# Patient Record
Sex: FEMALE | Race: BLACK OR AFRICAN AMERICAN | Employment: FULL TIME | URBAN - METROPOLITAN AREA
[De-identification: names, ages, dates, MRNs, and addresses within clinical notes are randomized per-mention and may not be internally consistent; named-entity substitution may affect disease eponyms.]

---
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
## 2020-01-01 NOTE — ED PROVIDER NOTES
ED Attending Attestation Note     Date of evaluation: 12/31/2019    This patient was seen by the advance practice provider. I have seen and examined the patient, agree with the workup, evaluation, management and diagnosis. The care plan has been discussed. I have reviewed the ECG and concur with the DEVIN's interpretation. My assessment reveals a well-appearing female no acute distress presents with flulike symptoms with cough congestion and subjective fevers. On my evaluation she has mild cobblestoning in the posterior oropharynx but appears well-hydrated. She has clear breath sounds with active coughing mildly decreased in the right lung base. Otherwise soft and benign abdominal examination for intervention of the neck without nuchal rigidity.      Jennie Cantor MD  12/31/19 2001

## 2020-01-01 NOTE — ED PROVIDER NOTES
[facility]          PHYSICIAN ASSISTANT NOTE       Date of evaluation: 12/31/2019    Chief Complaint     Cough      History of Present Illness     Suad Williamson is a 23 y.o. female with a past medical history as noted below who presents to the Emergency Department with a complaint of \"flu-like symptoms. \"  Patient reports over the past 3 days she has been experiencing cough, congestion, rhinorrhea, myalgias, subjective fevers and generalized malaise. Reports that she lives in an apartment with multiple family members, all of which have had similar symptoms recently. She reports that some of them \"may have tested positive\" for the flu, though she is not completely sure. She does not believe that any of them, other than her nephew is currently taking Tamiflu. She reports having some nausea with occasional emesis as well. Her cough is mildly productive of clear to whitish sputum. She reports a tickling sensation in the back of her throat that makes her feel like she needs to clear her throat regularly and often elicits cough. No rash. No chest pain or dyspnea. The patient carries a history of asthma, but reports that she has not had an asthma exacerbation in some time. Recently, she has not used her rescue inhaler in many months. She is also concerned that she may be pregnant. Review of Systems     Review of Systems   Constitutional: Positive for activity change, appetite change, chills, fatigue and fever. Negative for diaphoresis and unexpected weight change. HENT: Positive for postnasal drip, rhinorrhea, sinus pressure, sneezing and sore throat. Negative for congestion, drooling and mouth sores. Eyes: Negative for pain, redness and itching. Respiratory: Positive for cough. Negative for chest tightness, shortness of breath and wheezing. Cardiovascular: Negative for chest pain, palpitations and leg swelling.    Gastrointestinal: Positive for nausea Musculoskeletal: Normal range of motion. General: No tenderness. Skin:     General: Skin is warm and dry. Coloration: Skin is not pale. Findings: No rash. Neurological:      Mental Status: She is alert and oriented to person, place, and time. Psychiatric:         Behavior: Behavior normal.         Diagnostic Results     RADIOLOGY:  XR CHEST STANDARD (2 VW)   Final Result      No acute cardiopulmonary findings. LABS:   Results for orders placed or performed during the hospital encounter of 12/31/19   Rapid Flu Swab   Result Value Ref Range    Rapid Influenza A Ag Negative Negative    Rapid Influenza B Ag Negative Negative   Pregnancy, Urine   Result Value Ref Range    HCG(Urine) Pregnancy Test Negative Detects HCG level >20 MIU/mL   Basic Metabolic Panel w/ Reflex to MG   Result Value Ref Range    Sodium 138 136 - 145 mmol/L    Potassium reflex Magnesium 3.8 3.5 - 5.1 mmol/L    Chloride 103 99 - 110 mmol/L    CO2 19 (L) 21 - 32 mmol/L    Anion Gap 16 3 - 16    Glucose 99 70 - 99 mg/dL    BUN 9 7 - 20 mg/dL    CREATININE 0.7 0.6 - 1.1 mg/dL    GFR Non-African American >60 >60    GFR African American >60 >60    Calcium 9.1 8.3 - 10.6 mg/dL       ED BEDSIDE ULTRASOUND:  N/A    RECENT VITALS:  BP: (!) 126/115, Temp: 99.7 °F (37.6 °C), Heart Rate: 97, Resp: 20, SpO2: 93 %     Procedures     N/A    ED Course     Nursing Notes, Past Medical Hx,Past Surgical Hx, Social Hx, Allergies, and Family Hx were reviewed.     The patient was given the following medications:  Orders Placed This Encounter   Medications    ondansetron (ZOFRAN-ODT) disintegrating tablet 4 mg    ibuprofen (ADVIL;MOTRIN) tablet 400 mg    acetaminophen (TYLENOL) tablet 650 mg    guaiFENesin-dextromethorphan (ROBITUSSIN DM) 100-10 MG/5ML syrup 5 mL       CONSULTS:  None    MEDICAL DECISION MAKING / ASSESSMENT / Regi Lab is admitted to the Emergency Department for evaluation of her chief complaint as described in the history of present illness. Complete history and physical was performed by me and my attending. Nursing notes, past medical history, surgical history, family history and social history were reviewed and addressed in the HPI. Kwan Sood is a 23 y.o. female who presents to the emergency department with a complaint of flu-like symptoms. Patient has had symptoms for the past 3 to 4 days. She reports several family members having similar illness symptoms, believes a number of them have been positive for the flu. On physical examination, she is well-appearing, demonstrates some mild pharyngeal erythema with cobblestoning, has clear breath sounds and is hemodynamically stable and within normal limits. Rapid flu swab is negative. Chest x-ray is unremarkable. BMP is also unremarkable. Her presenting symptoms and associated physical exam findings are most consistent with a non-complicated upper respiratory tract infection for which my clinical index of suspicion for a life-threatening serious bacterial illness is very low. There are no clinical manifestations of impending respiratory distress or severe sepsis. She is therefore expected to do well with conservative management, hydration and traditional supportive care. Patient demonstrated improvement with Zofran, Advil and Tylenol in the emergency department. I strongly advised her to push oral hydration at home. Discussed her contagious nature and stay away from others with significant illness at this time. Follow-up via primary care I discussed this plan at length the patient who verbalizes understanding and is in agreement. The patient is currently stable and will be discharged home for continued self-care. Please see patient's AVS for additional discharge instructions. The patient was seen and evaluated by myself and the attending physician, Barron Vinson MD who agrees with my assessment, treatment and plan.         Clinical Impression     1.  Upper respiratory tract infection, unspecified type        Disposition     PATIENT REFERRED TO:  Shira Zuniga MD  [address] [facility] [address]  [phone]    Schedule an appointment as soon as possible for a visit in 3 days  If symptoms are not improving on the prescribed regimen      DISCHARGE MEDICATIONS:  Discharge Medication List as of 12/31/2019  9:34 PM          DISPOSITION Decision To Discharge 12/31/2019 09:32:32 PM       [address] [place] [address]  12/31/19 1777

## 2020-01-26 ENCOUNTER — HOSPITAL ENCOUNTER (EMERGENCY)
Age: 20
Discharge: HOME | End: 2020-01-26
Payer: MEDICAID

## 2020-01-26 VITALS
HEIGHT: 65 IN | DIASTOLIC BLOOD PRESSURE: 68 MMHG | WEIGHT: 196 LBS | SYSTOLIC BLOOD PRESSURE: 125 MMHG | HEART RATE: 67 BPM | OXYGEN SATURATION: 100 % | BODY MASS INDEX: 32.65 KG/M2 | TEMPERATURE: 98.3 F | RESPIRATION RATE: 16 BRPM

## 2020-01-26 LAB
ANION GAP SERPL CALCULATED.3IONS-SCNC: 14 MMOL/L (ref 3–16)
BACTERIA WET PREP: NORMAL
BASOPHILS ABSOLUTE: 0 K/UL (ref 0–0.2)
BASOPHILS RELATIVE PERCENT: 0.3 %
BILIRUBIN URINE: NEGATIVE
BLOOD, URINE: ABNORMAL
BUN BLDV-MCNC: 12 MG/DL (ref 7–20)
CALCIUM SERPL-MCNC: 9.3 MG/DL (ref 8.3–10.6)
CHLORIDE BLD-SCNC: 103 MMOL/L (ref 99–110)
CLARITY: CLEAR
CLUE CELLS: NORMAL
CO2: 20 MMOL/L (ref 21–32)
COLOR: YELLOW
CREAT SERPL-MCNC: 0.6 MG/DL (ref 0.6–1.1)
EOSINOPHILS ABSOLUTE: 0.3 K/UL (ref 0–0.6)
EOSINOPHILS RELATIVE PERCENT: 4.4 %
EPITHELIAL CELLS WET PREP: NORMAL
EPITHELIAL CELLS, UA: 0 /HPF (ref 0–5)
GFR AFRICAN AMERICAN: >60
GFR NON-AFRICAN AMERICAN: >60
GLUCOSE BLD-MCNC: 79 MG/DL (ref 70–99)
GLUCOSE URINE: NEGATIVE MG/DL
HCG QUALITATIVE: NEGATIVE
HCT VFR BLD CALC: 34.9 % (ref 36–48)
HEMOGLOBIN: 11 G/DL (ref 12–16)
HYALINE CASTS: 0 /LPF (ref 0–8)
KETONES, URINE: NEGATIVE MG/DL
LEUKOCYTE ESTERASE, URINE: NEGATIVE
LYMPHOCYTES ABSOLUTE: 1.5 K/UL (ref 1–5.1)
LYMPHOCYTES RELATIVE PERCENT: 20.4 %
MCH RBC QN AUTO: 23.1 PG (ref 26–34)
MCHC RBC AUTO-ENTMCNC: 31.6 G/DL (ref 31–36)
MCV RBC AUTO: 73.1 FL (ref 80–100)
MICROSCOPIC EXAMINATION: YES
MONOCYTES ABSOLUTE: 0.8 K/UL (ref 0–1.3)
MONOCYTES RELATIVE PERCENT: 10.2 %
NEUTROPHILS ABSOLUTE: 4.8 K/UL (ref 1.7–7.7)
NEUTROPHILS RELATIVE PERCENT: 64.7 %
NITRITE, URINE: NEGATIVE
PDW BLD-RTO: 17.4 % (ref 12.4–15.4)
PH UA: 6.5 (ref 5–8)
PLATELET # BLD: 248 K/UL (ref 135–450)
PMV BLD AUTO: 9.1 FL (ref 5–10.5)
POTASSIUM SERPL-SCNC: 3.7 MMOL/L (ref 3.5–5.1)
PROTEIN UA: NEGATIVE MG/DL
RBC # BLD: 4.78 M/UL (ref 4–5.2)
RBC UA: 98 /HPF (ref 0–4)
RBC WET PREP: NORMAL
SODIUM BLD-SCNC: 137 MMOL/L (ref 136–145)
SOURCE WET PREP: NORMAL
SPECIFIC GRAVITY UA: 1.02 (ref 1–1.03)
TRICHOMONAS PREP: NORMAL
URINE REFLEX TO CULTURE: ABNORMAL
URINE TYPE: ABNORMAL
UROBILINOGEN, URINE: 1 E.U./DL
WBC # BLD: 7.5 K/UL (ref 4–11)
WBC UA: 0 /HPF (ref 0–5)
WBC WET PREP: NORMAL
YEAST WET PREP: NORMAL

## 2020-01-26 PROCEDURE — 85025 COMPLETE CBC W/AUTO DIFF WBC: CPT

## 2020-01-26 PROCEDURE — 84703 CHORIONIC GONADOTROPIN ASSAY: CPT

## 2020-01-26 PROCEDURE — 87591 N.GONORRHOEAE DNA AMP PROB: CPT

## 2020-01-26 PROCEDURE — 87491 CHLMYD TRACH DNA AMP PROBE: CPT

## 2020-01-26 PROCEDURE — 80048 BASIC METABOLIC PNL TOTAL CA: CPT

## 2020-01-26 PROCEDURE — 87210 SMEAR WET MOUNT SALINE/INK: CPT

## 2020-01-26 PROCEDURE — 81001 URINALYSIS AUTO W/SCOPE: CPT

## 2020-01-26 PROCEDURE — 99284 EMERGENCY DEPT VISIT MOD MDM: CPT

## 2020-01-26 ASSESSMENT — PAIN DESCRIPTION - PROGRESSION: CLINICAL_PROGRESSION: GRADUALLY WORSENING

## 2020-01-26 ASSESSMENT — PAIN - FUNCTIONAL ASSESSMENT: PAIN_FUNCTIONAL_ASSESSMENT: PREVENTS OR INTERFERES SOME ACTIVE ACTIVITIES AND ADLS

## 2020-01-26 ASSESSMENT — ENCOUNTER SYMPTOMS
BACK PAIN: 0
NAUSEA: 0
VOMITING: 0
SHORTNESS OF BREATH: 0
COLOR CHANGE: 0

## 2020-01-26 ASSESSMENT — PAIN DESCRIPTION - DESCRIPTORS: DESCRIPTORS: CRAMPING

## 2020-01-26 ASSESSMENT — PAIN DESCRIPTION - PAIN TYPE: TYPE: ACUTE PAIN

## 2020-01-26 ASSESSMENT — PAIN SCALES - GENERAL: PAINLEVEL_OUTOF10: 6

## 2020-01-26 ASSESSMENT — PAIN DESCRIPTION - FREQUENCY: FREQUENCY: INTERMITTENT

## 2020-01-26 ASSESSMENT — PAIN DESCRIPTION - LOCATION: LOCATION: ABDOMEN

## 2020-01-27 LAB
C TRACH DNA GENITAL QL NAA+PROBE: NEGATIVE
N. GONORRHOEAE DNA: NEGATIVE

## 2020-01-27 NOTE — ED PROVIDER NOTES
[address] [facility]      Pt Name: Sumi Vides  MRN: [id number]  [place] 2000  Date of evaluation: 2020  Provider: Kern Galeazzi, PA    This patient was not seen and evaluated by the attending physician No att. providers found. CHIEF COMPLAINT       Chief Complaint   Patient presents with    Vaginal Bleeding     patient states heavy vaginal bleeding x 2 days. also requests a preg test and for someone to check her csection scar. CRITICAL CARE TIME   I performed a total Critical Care time of  15 minutes, excluding separately reportable procedures. There was a high probability of clinically significant/life threatening deterioration in the patient's condition which required my urgent intervention. Not limited to multiple reexaminations, discussions with attending physician and consultants. HISTORY OF PRESENT ILLNESS  (Location/Symptom, Timing/Onset, Context/Setting, Quality, Duration, Modifying Factors, Severity.)   Sumi Vides is a 23 y.o. female who presents to the emergency department via EMS. She tells me that she is homeless and lost custody of her  child. She states that the person she is living with currently is also struggling so she is hungry and requesting something to eat. She complains of vaginal bleeding that she initially described as heavy and then states she has had to change her pad twice today. She had a   at [facility]. She has had bleeding since then and then started again 2 days ago. No large clots. She is also requesting a pregnancy test.  No fevers or vomiting. Past medical history of asthma and PTSD. Nursing Notes were reviewed and I agree. REVIEW OF SYSTEMS    (2-9 systems for level 4, 10 or more for level 5)     Review of Systems   Constitutional: Negative for fever. Respiratory: Negative for shortness of breath.     Gastrointestinal: Negative for nausea and (1.651 m) 196 lb (88.9 kg)     Physical Exam  Vitals signs and nursing note reviewed. Constitutional:       Appearance: Normal appearance. HENT:      Head: Normocephalic. Neck:      Musculoskeletal: Normal range of motion. Cardiovascular:      Rate and Rhythm: Normal rate. Genitourinary:     Comments: External genital genitalia reveals no swelling, erythema, or external lesions. No rash or ulcer noted. Speculum was inserted without difficulty. Scant blood in the vaginal vault. Bimanual exam reveals no cervical motion tenderness, no uterine tenderness, and no adnexal tenderness. No masses appreciated. Performed with chaperone. Pt advised this is not full STD screening or PAP smear. Pt advised to follow up with GYN for further testing, pap smear and follow up care. Pt advised to follow up in 2 days for pelvic culture results. Do not have sex until you know your culture results are negative and you have further advised. Neurological:      Mental Status: She is alert.          DIAGNOSTIC RESULTS     NONE    LABS:  Labs Reviewed   CBC WITH AUTO DIFFERENTIAL - Abnormal; Notable for the following components:       Result Value    Hemoglobin 11.0 (*)     Hematocrit 34.9 (*)     MCV 73.1 (*)     MCH 23.1 (*)     RDW 17.4 (*)     All other components within normal limits    Narrative:     Performed at:  [address] [address]   Phone [phone]   BASIC METABOLIC PANEL - Abnormal; Notable for the following components:    CO2 20 (*)     All other components within normal limits    Narrative:     Performed at:  [address] [address]   Phone [phone]   URINE RT REFLEX TO CULTURE - Abnormal; Notable for the following components:    Blood, Urine LARGE (*)     All other components within normal limits    Narrative:     Performed at:  [facility]  [address] [place], [place] [address]   Phone [phone]   MICROSCOPIC URINALYSIS - Abnormal; Notable for the following components:    RBC, UA 98 (*)     All other components within normal limits    Narrative:     Performed at:  [facility]  [address] [place], [place] [address]   Phone [phone]   WET PREP, GENITAL    Narrative:     Performed at:  [facility]  [address] [facility] [address]   Phone [phone]   C. TRACHOMATIS N.GONORRHOEAE DNA   HCG, SERUM, QUALITATIVE    Narrative:     Performed at:  [facility]  [address] [facility] [address]   Phone [phone]       All other labs were within normal range or not returned as of this dictation. EMERGENCY DEPARTMENT COURSE and DIFFERENTIAL DIAGNOSIS/MDM:   Vitals:    Vitals:    20 1921   BP: 125/68   Pulse: 67   Resp: 16   Temp: 98.3 °F (36.8 °C)   TempSrc: Oral   SpO2: 100%   Weight: 196 lb (88.9 kg)   Height: 5' 5\" (1.651 m)     I discussed with María Bolaños and/or family the exam results, diagnosis, care, prognosis, reasons to return and the importance of follow up. Patient and/or family is in full agreement with plan and all questions have been answered. Specific discharge instructions explained, including reasons to return to the emergency department. María Bolaños is well appearing, non-toxic, and afebrile at the time of discharge. Patient has been in both [place] and [facility] since her  on . Her belly is soft and nontender she has very scant blood in the vaginal vault a negative pregnancy test.  Stable mild anemia. Advised her to take supplements with iron and follow-up with gynecology. She did request STD testing. She is eating a sandwich my serial exam continues to have a benign abdomen. Return for new, worsening or other concerns.     I estimate there is LOW risk for ACUTE APPENDICITIS, BOWEL OBSTRUCTION, CHOLECYSTITIS, DIVERTICULITIS, INCARCERATED HERNIA, PANCREATITIS, PELVIC INFLAMMATORY DISEASE, OVARIAN TORSION, PERFORATED BOWEL,  BOWEL ISCHEMIA, CARDIAC ISCHEMIA, ECTOPIC PREGNANCY, or TUBO-OVARIAN ABSCESS, thus I consider the discharge disposition reasonable. Also, there is no evidence or peritonitis, sepsis, or toxicity. CONSULTS:  None    PROCEDURES:  None    FINAL IMPRESSION      1. Vaginal bleeding    2.  Negative pregnancy test          DISPOSITION/PLAN   DISPOSITION Decision To Discharge 01/26/2020 08:38:28 PM      PATIENT REFERRED TO:  Diann Neves, [address] [place], [place] [id number] [phone]    Call in 1 day  For follow up with OBGYN      DISCHARGE MEDICATIONS:  New Prescriptions    No medications on file       (Please note that portions of this note were completed with a voice recognition program.  Efforts were made to edit the dictations but occasionally words are mis-transcribed.)    Kern Galeazzi, [address], [address]  01/26/20 0612

## 2020-01-27 NOTE — ED NOTES
Discharge and education instructions reviewed. Patient instructed to follow-up as noted - return to emergency department if symptoms worsen. Patient verbalized understanding. Patient denied questions at this time. No acute distress noted. Discharged per provider with discharge instructions. Pt given lyft per charge RN. Pt given lyft information.       Kandis Frederick RN  01/26/20 6621

## 2020-02-13 ENCOUNTER — HOSPITAL ENCOUNTER (EMERGENCY)
Age: 20
Discharge: HOME | End: 2020-02-13
Payer: MEDICAID

## 2020-02-13 VITALS
BODY MASS INDEX: 31.63 KG/M2 | SYSTOLIC BLOOD PRESSURE: 102 MMHG | HEART RATE: 66 BPM | HEIGHT: 65 IN | WEIGHT: 189.82 LBS | OXYGEN SATURATION: 100 % | DIASTOLIC BLOOD PRESSURE: 80 MMHG | RESPIRATION RATE: 18 BRPM | TEMPERATURE: 98.1 F

## 2020-02-13 LAB
BACTERIA WET PREP: NORMAL
BACTERIA: ABNORMAL /HPF
BILIRUBIN URINE: NEGATIVE
BLOOD, URINE: NEGATIVE
CLARITY: CLEAR
CLUE CELLS: NORMAL
COLOR: YELLOW
EPITHELIAL CELLS WET PREP: NORMAL
EPITHELIAL CELLS, UA: ABNORMAL /HPF (ref 0–5)
GLUCOSE URINE: NEGATIVE MG/DL
HCG(URINE) PREGNANCY TEST: NEGATIVE
KETONES, URINE: ABNORMAL MG/DL
LEUKOCYTE ESTERASE, URINE: NEGATIVE
MICROSCOPIC EXAMINATION: YES
MUCUS: ABNORMAL /LPF
NITRITE, URINE: NEGATIVE
PH UA: 6 (ref 5–8)
PROTEIN UA: ABNORMAL MG/DL
RBC UA: ABNORMAL /HPF (ref 0–4)
RBC WET PREP: NORMAL
SOURCE WET PREP: NORMAL
SPECIFIC GRAVITY UA: >=1.03 (ref 1–1.03)
TRICHOMONAS PREP: NORMAL
URINE REFLEX TO CULTURE: ABNORMAL
URINE TYPE: ABNORMAL
UROBILINOGEN, URINE: 0.2 E.U./DL
WBC UA: ABNORMAL /HPF (ref 0–5)
WBC WET PREP: NORMAL
YEAST WET PREP: NORMAL

## 2020-02-13 PROCEDURE — 87491 CHLMYD TRACH DNA AMP PROBE: CPT

## 2020-02-13 PROCEDURE — 99281 EMR DPT VST MAYX REQ PHY/QHP: CPT

## 2020-02-13 PROCEDURE — 81001 URINALYSIS AUTO W/SCOPE: CPT

## 2020-02-13 PROCEDURE — 87591 N.GONORRHOEAE DNA AMP PROB: CPT

## 2020-02-13 PROCEDURE — 87210 SMEAR WET MOUNT SALINE/INK: CPT

## 2020-02-13 PROCEDURE — 84703 CHORIONIC GONADOTROPIN ASSAY: CPT

## 2020-02-13 ASSESSMENT — PAIN DESCRIPTION - LOCATION: LOCATION: ABDOMEN

## 2020-02-13 ASSESSMENT — PAIN SCALES - GENERAL: PAINLEVEL_OUTOF10: 5

## 2020-02-13 ASSESSMENT — PAIN DESCRIPTION - ONSET: ONSET: SUDDEN

## 2020-02-13 ASSESSMENT — ENCOUNTER SYMPTOMS
RESPIRATORY NEGATIVE: 1
GASTROINTESTINAL NEGATIVE: 1

## 2020-02-13 ASSESSMENT — PAIN DESCRIPTION - FREQUENCY: FREQUENCY: CONTINUOUS

## 2020-02-13 ASSESSMENT — PAIN DESCRIPTION - PAIN TYPE: TYPE: ACUTE PAIN

## 2020-02-13 ASSESSMENT — PAIN DESCRIPTION - ORIENTATION: ORIENTATION: LOWER

## 2020-02-14 LAB
C TRACH DNA GENITAL QL NAA+PROBE: NEGATIVE
N. GONORRHOEAE DNA: NEGATIVE

## 2020-02-14 NOTE — ED PROVIDER NOTES
[address] [facility]  eMERGENCY dEPARTMENT eNCOUnter    Pt Name: @@  MRN: [id number]  [birth year]  Date of evaluation: 2020  Provider: MARIA DE JESUS Isaacs CNP     Evaluated by the advanced practice provider    CHIEF COMPLAINT       Chief Complaint   Patient presents with    Vaginal Discharge     States that she has taken multiple pregnancy tests from [facility] and they were positive, states that she just had a  in nov, and is having a brown discharge and pain around her  scar         HISTORY OF PRESENT ILLNESS  (Location/Symptom, Timing/Onset, Context/Setting, Quality, Duration, Modifying Factors, Severity.)   Hunter Campos is a 23 y.o. female who presents to the emergency department requesting a pregnancy test that she is taken multiple pregnancy test from [facility] and they were positive. No abdominal pain. Last menstrual cycle last week and a half of January. Was also seen in the emergency department  and had a vaginal exam done for vaginal discharge. Wet prep was negative and gonorrhea and Chlamydia were negative at that time as well. Full-term delivery  in 2019. Did not have a menstrual cycle and 2020. Had the last menstrual cycle in 2020    Nursing Notes were reviewed and I agree. REVIEW OF SYSTEMS    (2-9 systems for level 4, 10 or more for level 5)     Review of Systems   Constitutional: Negative. HENT: Negative. Respiratory: Negative. Cardiovascular: Negative. Gastrointestinal: Negative. Genitourinary: Positive for vaginal discharge. Negative for difficulty urinating, dysuria, frequency, genital sores and pelvic pain. Brownish intermittently   Musculoskeletal: Negative. Except as noted above the remainder of the review of systems was reviewed and negative.        PAST MEDICAL HISTORY         Diagnosis Date    ADHD     Anxiety     Asthma     Constipation  Depression     PTSD (post-traumatic stress disorder)     Seasonal allergies        SURGICAL HISTORY           Procedure Laterality Date     SECTION  2019       CURRENT MEDICATIONS       Discharge Medication List as of 2020  9:12 PM      CONTINUE these medications which have NOT CHANGED    Details   naproxen (NAPROSYN) 500 MG tablet Take 1 tablet by mouth 2 times daily (with meals), Disp-60 tablet, R-0Print      ziprasidone (GEODON) 80 MG capsule Take 80 mg by mouth 2 times daily (with meals)Historical Med      clomiPRAMINE (ANAFRANIL) 25 MG capsule Take 25 mg by mouth nightlyHistorical Med      lisdexamfetamine (VYVANSE) 50 MG capsule Take 50 mg by mouth every morning . Historical Med      Ferrous Sulfate (IRON) 28 MG TABS Take by mouthHistorical Med      Norgestimate-Eth Estradiol (MONONESSA PO) Take by mouthHistorical Med      albuterol sulfate  (90 Base) MCG/ACT inhaler Inhale 2 puffs into the lungs every 6 hours as needed for WheezingHistorical Med      FLUTICASONE FUROATE NA by Nasal routeHistorical Med             ALLERGIES     Patient has no known allergies. FAMILY HISTORY     History reviewed. No pertinent family history. No family status information on file. SOCIAL HISTORY      reports that she has been smoking cigarettes. She has been smoking about 0.25 packs per day. She has never used smokeless tobacco. She reports current alcohol use. She reports that she does not use drugs. PHYSICAL EXAM    (up to 7 for level 4, 8 or more for level 5)      ED Triage Vitals [20 1937]   BP Temp Temp Source Heart Rate Resp SpO2 Height Weight - Scale   102/80 98.1 °F (36.7 °C) Oral 66 18 100 % 5' 5\" (1.651 m) 189 lb 13.1 oz (86.1 kg)       Physical Exam  Vitals signs and nursing note reviewed. Constitutional:       General: She is awake. She is not in acute distress. Appearance: Normal appearance. She is well-developed. She is not ill-appearing or toxic-appearing.

## 2020-02-14 NOTE — ED NOTES
Discharge instructions reviewed with pt and pt denied having any questions. Discharge paperwork signed and pt discharged.         Juan Pablo Jiang RN  02/13/20 5021

## 2020-03-15 ENCOUNTER — HOSPITAL ENCOUNTER (EMERGENCY)
Age: 20
Discharge: HOME | End: 2020-03-15
Attending: EMERGENCY MEDICINE
Payer: MEDICAID

## 2020-03-15 VITALS
HEIGHT: 65 IN | WEIGHT: 193.56 LBS | OXYGEN SATURATION: 100 % | TEMPERATURE: 97.4 F | DIASTOLIC BLOOD PRESSURE: 87 MMHG | BODY MASS INDEX: 32.25 KG/M2 | HEART RATE: 67 BPM | RESPIRATION RATE: 20 BRPM | SYSTOLIC BLOOD PRESSURE: 127 MMHG

## 2020-03-15 LAB
BILIRUBIN URINE: NEGATIVE
BLOOD, URINE: NEGATIVE
CLARITY: CLEAR
COLOR: YELLOW
GLUCOSE URINE: NEGATIVE MG/DL
HCG(URINE) PREGNANCY TEST: NEGATIVE
KETONES, URINE: ABNORMAL MG/DL
LEUKOCYTE ESTERASE, URINE: NEGATIVE
MICROSCOPIC EXAMINATION: ABNORMAL
NITRITE, URINE: NEGATIVE
PH UA: 6.5 (ref 5–8)
PROTEIN UA: NEGATIVE MG/DL
S PYO AG THROAT QL: NEGATIVE
SPECIFIC GRAVITY UA: >=1.03 (ref 1–1.03)
URINE REFLEX TO CULTURE: ABNORMAL
URINE TYPE: ABNORMAL
UROBILINOGEN, URINE: 1 E.U./DL

## 2020-03-15 PROCEDURE — 99283 EMERGENCY DEPT VISIT LOW MDM: CPT

## 2020-03-15 PROCEDURE — 6370000000 HC RX 637 (ALT 250 FOR IP): Performed by: EMERGENCY MEDICINE

## 2020-03-15 PROCEDURE — 81003 URINALYSIS AUTO W/O SCOPE: CPT

## 2020-03-15 PROCEDURE — 6360000002 HC RX W HCPCS: Performed by: EMERGENCY MEDICINE

## 2020-03-15 PROCEDURE — 84703 CHORIONIC GONADOTROPIN ASSAY: CPT

## 2020-03-15 PROCEDURE — 87880 STREP A ASSAY W/OPTIC: CPT

## 2020-03-15 PROCEDURE — 87081 CULTURE SCREEN ONLY: CPT

## 2020-03-15 RX ORDER — IBUPROFEN 600 MG/1
600 TABLET ORAL ONCE
Status: COMPLETED | OUTPATIENT
Start: 2020-03-15 | End: 2020-03-15

## 2020-03-15 RX ORDER — DEXAMETHASONE 4 MG/1
8 TABLET ORAL ONCE
Status: COMPLETED | OUTPATIENT
Start: 2020-03-15 | End: 2020-03-15

## 2020-03-15 RX ORDER — IBUPROFEN 600 MG/1
600 TABLET ORAL EVERY 6 HOURS PRN
Qty: 40 TABLET | Refills: 0 | Status: SHIPPED | OUTPATIENT
Start: 2020-03-15 | End: 2020-08-20

## 2020-03-15 RX ADMIN — IBUPROFEN 600 MG: 600 TABLET, FILM COATED ORAL at 12:14

## 2020-03-15 RX ADMIN — DEXAMETHASONE 8 MG: 4 TABLET ORAL at 12:14

## 2020-03-15 ASSESSMENT — PAIN DESCRIPTION - LOCATION: LOCATION: THROAT

## 2020-03-15 ASSESSMENT — PAIN SCALES - GENERAL
PAINLEVEL_OUTOF10: 6

## 2020-03-15 ASSESSMENT — PAIN DESCRIPTION - ORIENTATION: ORIENTATION: MID

## 2020-03-15 ASSESSMENT — PAIN DESCRIPTION - DESCRIPTORS: DESCRIPTORS: SORE

## 2020-03-15 ASSESSMENT — PAIN DESCRIPTION - FREQUENCY: FREQUENCY: CONTINUOUS

## 2020-03-15 ASSESSMENT — PAIN DESCRIPTION - PAIN TYPE: TYPE: ACUTE PAIN

## 2020-03-15 NOTE — ED NOTES
Pt given lemon lime soda/lebron crackers/granola bar s/p she was cleared to eat/drink by TRACY Aponte/     Billy Alberts RN  03/15/20 7691

## 2020-03-15 NOTE — ED PROVIDER NOTES
CHIEF COMPLAINT  Pharyngitis      HISTORY OF PRESENT ILLNESS  Keenan Blackburn is a 23 y.o. female who presents to the ED complaining of sore throat that started this morning. Patient states she looked in the mirror and it looks like her uvula might be slightly swollen. States the pain is a sharp/burning pain when she swallows. Denies any difficulty tolerating her secretions. No shortness of breath. Denies any sore throat yesterday. No fevers or chills. No nausea or vomiting. No cough or sputum production. States she does have some nasal congestion has been present for the past 2 days. Also has seasonal allergies. Denies take any medication for sore throat prior to coming to the ED. Smokes approximately a quarter of a pack of cigarettes a day. No other complaints, modifying factors or associated symptoms. Nursing notes reviewed. Past Medical History:   Diagnosis Date    ADHD     Anxiety     Asthma     Constipation     Depression     PTSD (post-traumatic stress disorder)     Seasonal allergies      Past Surgical History:   Procedure Laterality Date     SECTION  2019     No family history on file.   Social History     Socioeconomic History    Marital status: Single     Spouse name: Not on file    Number of children: Not on file    Years of education: Not on file    Highest education level: Not on file   Occupational History    Not on file   Social Needs    Financial resource strain: Not on file    Food insecurity     Worry: Not on file     Inability: Not on file    Transportation needs     Medical: Not on file     Non-medical: Not on file   Tobacco Use    Smoking status: Current Some Day Smoker     Packs/day: 0.25     Types: Cigarettes    Smokeless tobacco: Never Used   Substance and Sexual Activity    Alcohol use: Yes     Comment: Special occassions    Drug use: No    Sexual activity: Not on file   Lifestyle    Physical activity     Days per week: Not on file Minutes per session: Not on file    Stress: Not on file   Relationships    Social connections     Talks on phone: Not on file     Gets together: Not on file     Attends Muslim service: Not on file     Active member of club or organization: Not on file     Attends meetings of clubs or organizations: Not on file     Relationship status: Not on file    Intimate partner violence     Fear of current or ex partner: Not on file     Emotionally abused: Not on file     Physically abused: Not on file     Forced sexual activity: Not on file   Other Topics Concern    Not on file   Social History Narrative    Not on file     No current facility-administered medications for this encounter. Current Outpatient Medications   Medication Sig Dispense Refill    ibuprofen (ADVIL;MOTRIN) 600 MG tablet Take 1 tablet by mouth every 6 hours as needed for Pain 40 tablet 0    Dyclonine-Glycerin (CEPACOL SORE THROAT SPRAY) 0.1-33 % LIQD 2 sprays in the throat every 3 hours as needed for sore throat 118 mL 0    ziprasidone (GEODON) 80 MG capsule Take 80 mg by mouth 2 times daily (with meals)      clomiPRAMINE (ANAFRANIL) 25 MG capsule Take 25 mg by mouth nightly      lisdexamfetamine (VYVANSE) 50 MG capsule Take 50 mg by mouth every morning .       Ferrous Sulfate (IRON) 28 MG TABS Take by mouth      Norgestimate-Eth Estradiol (MONONESSA PO) Take by mouth      albuterol sulfate  (90 Base) MCG/ACT inhaler Inhale 2 puffs into the lungs every 6 hours as needed for Wheezing      FLUTICASONE FUROATE NA by Nasal route       No Known Allergies      REVIEW OF SYSTEMS  10 systems reviewed, pertinent positives per HPI otherwise noted to be negative    PHYSICAL EXAM  /87   Pulse 67   Temp 97.4 °F (36.3 °C) (Oral)   Resp 20   Ht 5' 5\" (1.651 m)   Wt 193 lb 9 oz (87.8 kg)   LMP 02/21/2020 (Approximate)   SpO2 100%   BMI 32.21 kg/m²      CONSTITUTIONAL: AOx4, cooperative with exam, afebrile   HEAD: normocephalic, atraumatic   EYES: PERRL, EOMI, anicteric sclera   ENT: Moist mucous membranes, uvula midline, mild swelling of the uvula, no tonsillar hypertrophy or swelling, no exudate, no stridor   NECK: Supple, symmetric, trachea midline, full range of motion, no meningismus   LUNGS: Bilateral breath sounds, CTAB, no rales/ronchi/wheezes   CARDIOVASCULAR: RRR, normal S1/S2, no m/r/g, 2+ pulses throughout   ABDOMEN: Soft, non-tender, non-distended, +BS   NEUROLOGIC:  MAEx4, GCS 15   MUSCULOSKELETAL: No clubbing, cyanosis or edema   SKIN: No rash, pallor or wounds on exposed surfaces         RADIOLOGY  X-RAYS:  I have reviewed radiologic plain film image(s). ALL OTHER NON-PLAIN FILM IMAGES SUCH AS CT, ULTRASOUND AND MRI HAVE BEEN READ BY THE RADIOLOGIST. No orders to display          EKG INTERPRETATION  None    PROCEDURES    ED COURSE/MDM  Viral pharyngitis, strep pharyngitis, uvulitis, postnasal drip, seasonal allergies    Patient seen and evaluated. History and physical as above. Nontoxic and afebrile. Does have mild swelling of the uvula. No tonsillar hypertrophy or exudates suggest strep pharyngitis although with uvula swelling, will obtain strep swab. No stridor or signs of airway compromise. No drooling. Tolerating her secretions and speaking in full sentences. Will provide a dose of Decadron and ibuprofen. Patient strep swab negative. Patient requesting pregnancy testing as well. Urinalysis and urine pregnancy negative. Patient updated on results. Plan for discharge with ibuprofen and Cepacol with outpatient follow-up. Patient agreeable. Return instructions provided. All questions answered. I estimate there is LOW risk for a ANAPHYLAXIS, DEEP SPACE INFECTION (e.g., PRATIMAS ANGINA OR RETROPHARYNGEAL ABSCESS), EPIGLOTTITIS, MENINGITIS, or AIRWAY COMPROMISE, thus I consider the discharge disposition reasonable. Also, there is no evidence or peritonitis, sepsis, or toxicity.  Poncho Abdi and I have discussed the diagnosis and risks, and we agree with discharging home to follow-up with their primary doctor. We also discussed returning to the Emergency Department immediately if new or worsening symptoms occur. We have discussed the symptoms which are most concerning (e.g., changing or worsening pain, trouble swallowing or breathing, neck stiffness or fever) that necessitate immediate return. Patient was given scripts for the following medications. I counseled patient how to take these medications. New Prescriptions    DYCLONINE-GLYCERIN (CEPACOL SORE THROAT SPRAY) 0.1-33 % LIQD    2 sprays in the throat every 3 hours as needed for sore throat    IBUPROFEN (ADVIL;MOTRIN) 600 MG TABLET    Take 1 tablet by mouth every 6 hours as needed for Pain           CLINICAL IMPRESSION  1. Acute pharyngitis, unspecified etiology        Blood pressure 127/87, pulse 67, temperature 97.4 °F (36.3 °C), temperature source Oral, resp. rate 20, height 5' 5\" (1.651 m), weight 193 lb 9 oz (87.8 kg), last menstrual period 02/21/2020, SpO2 100 %. DISPOSITION  Patient was discharged to home in good condition. Shaka Guajardo MD  [address] [place] [address] [place] [address]  [phone]    Call in 1 day  For a follow up appointment. Disclaimer: All medical record entries made by [employer] dictation.       (Please note that this note was completed with a voice recognition program. Every attempt was made to edit the dictations, but inevitably there remain words that are mis-transcribed.)            Josue Luong MD  03/15/20 5881

## 2020-03-15 NOTE — ED NOTES
Is currently homeless and her 4 month old daughter is in foster care     Nick Rebeca, [address] [facility]  03/15/20 0790

## 2020-03-15 NOTE — ED NOTES
dc'd to home  Awake alert  Skin warm and dry  resp easy and unlabored  Aware to return for worsening or changes  Aware we may call with further culture reports for strept screening  She is calling for ride per insurance  To return any worsening or changes     Aminata Torres RN  03/15/20 7307

## 2020-03-18 LAB — S PYO THROAT QL CULT: NORMAL

## 2020-03-18 NOTE — RESULT ENCOUNTER NOTE
Patient was unable to be reached over the phone, voice mail not set up, second phone on line wrong number  Will initiate the certified letter process at this time. March 18, 2020    Greg Carney NERY Robertson      Dear Ms. Greg Englsih,    During your Emergency Department visit on 3/15/2020, radiology and/or lab tests were taken and sent for analysis. The Emergency Department physician has reviewed the results and would like to discuss the findings with you. As of this time, attempts to reach you have been unsuccessful. Please contact the Emergency Department at [phone] and ask to speak to the Charge Nurse regarding your results and the possible need for further treatment. Sincerely,     Dr. Gal Ramírez  [facility]              March 18, 2020    Greg Carney SMerry Robertson      Dear Ms. Greg English,    During your Emergency Department visit on 3/15/2020, radiology and/or lab tests were taken and sent for analysis. The Emergency Department physician has reviewed the results and would like to discuss the findings with you. As of this time, attempts to reach you have been unsuccessful. Please contact the Emergency Department at [phone] and ask to speak to the Charge Nurse regarding your results and the possible need for further treatment.     Sincerely,     Dr. Gal Ramírez  [address]  [address]

## 2020-08-20 ENCOUNTER — HOSPITAL ENCOUNTER (EMERGENCY)
Age: 20
Discharge: HOME | End: 2020-08-20
Payer: MEDICAID

## 2020-08-20 ENCOUNTER — APPOINTMENT (OUTPATIENT)
Dept: GENERAL RADIOLOGY | Age: 20
End: 2020-08-20
Payer: MEDICAID

## 2020-08-20 VITALS
DIASTOLIC BLOOD PRESSURE: 55 MMHG | BODY MASS INDEX: 31.85 KG/M2 | RESPIRATION RATE: 14 BRPM | HEIGHT: 65 IN | OXYGEN SATURATION: 100 % | TEMPERATURE: 97.1 F | SYSTOLIC BLOOD PRESSURE: 108 MMHG | HEART RATE: 71 BPM | WEIGHT: 191.14 LBS

## 2020-08-20 LAB
BACTERIA WET PREP: ABNORMAL
BILIRUBIN URINE: NEGATIVE
BLOOD, URINE: NEGATIVE
CLARITY: CLEAR
CLUE CELLS: ABNORMAL
COLOR: NORMAL
EPITHELIAL CELLS WET PREP: ABNORMAL
GLUCOSE URINE: NEGATIVE MG/DL
HCG(URINE) PREGNANCY TEST: NEGATIVE
KETONES, URINE: NEGATIVE MG/DL
LEUKOCYTE ESTERASE, URINE: NEGATIVE
MICROSCOPIC EXAMINATION: NORMAL
NITRITE, URINE: NEGATIVE
PH UA: 5.5 (ref 5–8)
PROTEIN UA: NEGATIVE MG/DL
RBC WET PREP: ABNORMAL
SOURCE WET PREP: ABNORMAL
SPECIFIC GRAVITY UA: >=1.03 (ref 1–1.03)
TRICHOMONAS PREP: ABNORMAL
URINE REFLEX TO CULTURE: NORMAL
URINE TYPE: NORMAL
UROBILINOGEN, URINE: 0.2 E.U./DL
WBC WET PREP: ABNORMAL
YEAST WET PREP: ABNORMAL

## 2020-08-20 PROCEDURE — 87491 CHLMYD TRACH DNA AMP PROBE: CPT

## 2020-08-20 PROCEDURE — 81003 URINALYSIS AUTO W/O SCOPE: CPT

## 2020-08-20 PROCEDURE — 99283 EMERGENCY DEPT VISIT LOW MDM: CPT

## 2020-08-20 PROCEDURE — 84703 CHORIONIC GONADOTROPIN ASSAY: CPT

## 2020-08-20 PROCEDURE — 73560 X-RAY EXAM OF KNEE 1 OR 2: CPT

## 2020-08-20 PROCEDURE — 87591 N.GONORRHOEAE DNA AMP PROB: CPT

## 2020-08-20 PROCEDURE — 87210 SMEAR WET MOUNT SALINE/INK: CPT

## 2020-08-20 RX ORDER — METRONIDAZOLE 500 MG/1
500 TABLET ORAL 2 TIMES DAILY
Qty: 14 TABLET | Refills: 0 | Status: SHIPPED | OUTPATIENT
Start: 2020-08-20 | End: 2020-08-27

## 2020-08-20 RX ORDER — NAPROXEN 500 MG/1
500 TABLET ORAL 2 TIMES DAILY WITH MEALS
Qty: 30 TABLET | Refills: 0 | Status: SHIPPED | OUTPATIENT
Start: 2020-08-20

## 2020-08-20 ASSESSMENT — PAIN DESCRIPTION - ORIENTATION: ORIENTATION: RIGHT

## 2020-08-20 ASSESSMENT — PAIN DESCRIPTION - DESCRIPTORS: DESCRIPTORS: THROBBING

## 2020-08-20 ASSESSMENT — ENCOUNTER SYMPTOMS
EYE PAIN: 0
BACK PAIN: 0
COUGH: 0
DIARRHEA: 0
ABDOMINAL PAIN: 1
VOMITING: 0
NAUSEA: 1
SHORTNESS OF BREATH: 0

## 2020-08-20 ASSESSMENT — PAIN DESCRIPTION - PAIN TYPE: TYPE: ACUTE PAIN

## 2020-08-20 ASSESSMENT — PAIN DESCRIPTION - LOCATION: LOCATION: KNEE

## 2020-08-20 ASSESSMENT — PAIN SCALES - GENERAL: PAINLEVEL_OUTOF10: 4

## 2020-08-20 NOTE — LETTER
[facility] [address]  Phone: [phone]               August 20, 2020    Patient: Eleuterio Cevallos   YOB: 2000   Date of Visit: 8/20/2020       To Whom It May Concern:    Eleuterio Cevallos was seen and treated in our emergency department on 8/20/2020. She may return to work on 8/21/20.       Sincerely,       BRYSON Wang         Signature:__________________________________
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
## 2020-08-21 LAB
C TRACH DNA GENITAL QL NAA+PROBE: NEGATIVE
N. GONORRHOEAE DNA: NEGATIVE

## 2020-08-29 ENCOUNTER — APPOINTMENT (OUTPATIENT)
Dept: GENERAL RADIOLOGY | Age: 20
End: 2020-08-29
Payer: MEDICAID

## 2020-08-29 ENCOUNTER — HOSPITAL ENCOUNTER (EMERGENCY)
Age: 20
Discharge: HOME | End: 2020-08-29
Attending: EMERGENCY MEDICINE
Payer: MEDICAID

## 2020-08-29 VITALS
OXYGEN SATURATION: 99 % | HEART RATE: 75 BPM | SYSTOLIC BLOOD PRESSURE: 105 MMHG | WEIGHT: 196 LBS | BODY MASS INDEX: 32.65 KG/M2 | TEMPERATURE: 98 F | DIASTOLIC BLOOD PRESSURE: 69 MMHG | RESPIRATION RATE: 12 BRPM | HEIGHT: 65 IN

## 2020-08-29 PROCEDURE — 6370000000 HC RX 637 (ALT 250 FOR IP): Performed by: EMERGENCY MEDICINE

## 2020-08-29 PROCEDURE — 73090 X-RAY EXAM OF FOREARM: CPT

## 2020-08-29 PROCEDURE — 99283 EMERGENCY DEPT VISIT LOW MDM: CPT

## 2020-08-29 RX ORDER — IBUPROFEN 400 MG/1
800 TABLET ORAL ONCE
Status: COMPLETED | OUTPATIENT
Start: 2020-08-29 | End: 2020-08-29

## 2020-08-29 RX ORDER — IBUPROFEN 600 MG/1
600 TABLET ORAL 3 TIMES DAILY PRN
Qty: 90 TABLET | Refills: 0 | Status: SHIPPED | OUTPATIENT
Start: 2020-08-29

## 2020-08-29 RX ADMIN — IBUPROFEN 800 MG: 400 TABLET, FILM COATED ORAL at 08:11

## 2020-08-29 ASSESSMENT — PAIN DESCRIPTION - PAIN TYPE
TYPE: ACUTE PAIN
TYPE: ACUTE PAIN

## 2020-08-29 ASSESSMENT — PAIN SCALES - GENERAL
PAINLEVEL_OUTOF10: 8
PAINLEVEL_OUTOF10: 5
PAINLEVEL_OUTOF10: 8

## 2020-08-29 ASSESSMENT — PAIN - FUNCTIONAL ASSESSMENT: PAIN_FUNCTIONAL_ASSESSMENT: 0-10

## 2020-08-29 ASSESSMENT — PAIN DESCRIPTION - ORIENTATION
ORIENTATION: LEFT
ORIENTATION: LEFT;LOWER

## 2020-08-29 ASSESSMENT — PAIN DESCRIPTION - LOCATION
LOCATION: ARM
LOCATION: ARM

## 2020-08-29 ASSESSMENT — PAIN DESCRIPTION - DESCRIPTORS: DESCRIPTORS: SHOOTING

## 2020-08-29 NOTE — ED PROVIDER NOTES
eMERGENCY dEPARTMENT eNCOUnter      Pt Name: Mariya Anand  MRN: [id number]  [place] 2000  Date of evaluation: 2020  Provider: Juan Wells MD     [address] [place] [address]       Chief Complaint   Patient presents with    Arm Pain     left forearm; injured at work 3 days ago, tripped over equipment and fell. Radial pulse palpable         HISTORY OF PRESENT ILLNESS   (Location/Symptom, Timing/Onset,Context/Setting, Quality, Duration, Modifying Factors, Severity) Note limiting factors. HPI    Mariya Anand is a 23 y.o. female who presents to the emergency department with left arm pain from a injury. Patient states she was injured at work. Patient states it happened about 3 days ago. 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 Patient continues to work. Patient apparently was terminated on that same day. She now after 3 days comes in for evaluation under Workmen's Comp. Patient has some swelling. There is no obvious deformity. Patient is moving her extremities well. She does have medical history for anxiety asthma and depression. Nursing Notes were reviewed. REVIEW OFSYSTEMS    (2+ for level 4; 10+ for level 5)   Review of Systems    General: No fevers, chills or night sweats, No weight loss    Head:  No Sore throat,  No Ear Pain    Chest:  Nontender. No Cough, No SOB,  Chest Pain    GI: No abdominal pain or vomiting    : No dysuria or hematuria    Musculoskeletal: No unrelenting pain or night pain    Neurologic: No bowel or bladder incontinence, No saddle anesthesia, No leg weakness    All other systems reviewed and are negative.         PAST MEDICAL HISTORY     Past Medical History:   Diagnosis Date    ADHD     Anxiety     Asthma     Constipation     Depression     PTSD (post-traumatic stress disorder)     Seasonal allergies        SURGICAL HISTORY       Past Surgical History:   Procedure Laterality Date     SECTION  2019 °C) Oral 75 12 99 % 5' 5\" (1.651 m) 196 lb (88.9 kg)       Physical Exam    General: Alert and awake ×3. Nontoxic appearance. Well-developed well-nourished in no acute distress  HEENT: Normocephalic atraumatic. Neck is supple. Airway intact. No adenopathy  Cardiac: Regular rate and rhythm with no murmurs rubs or gallops  Pulmonary: Lungs are clear in all lung fields. No wheezing. No Rales. Abdomen: Soft and nontender. Negative hepatosplenomegaly. Bowel sounds are active  Extremities: Moving all extremities. No calf tenderness. Peripheral pulses all intact. Some tenderness to palpation of the left elbow and forearm. No obvious deformity seen. Neurovascular is intact  Skin: No skin lesions. No rashes  Neurologic: Cranial nerves II through XII was grossly intact. Nonfocal neurological exam  Psychiatric: Patient is pleasant. Mood is appropriate. DIAGNOSTIC RESULTS     EKG (Per Emergency Physician):       RADIOLOGY (Per Emergency Physician): Interpretation per the Radiologist below, if available at the time of this note:  Xr Radius Ulna Left (2 Views)    Result Date: 8/29/2020  EXAMINATION: TWO XRAY VIEWS OF THE LEFT FOREARM 8/29/2020 7:55 am COMPARISON: None. HISTORY: ORDERING SYSTEM PROVIDED HISTORY: pain TECHNOLOGIST PROVIDED HISTORY: Reason for exam:->pain Reason for Exam: PT. STATES X 3 DAYS AGO SHE TRIPPED OVER EQUIPMENT AT WORK AND FELL ONTO LT. ARM C/O RADIATING PAIN ALL OVER LT. FOREARM Acuity: Acute Type of Exam: Initial Mechanism of Injury: TRIPPED OVER EQUIPMENT AND FELL ONTO LT. ARM Relevant Medical/Surgical History: NO  HX OF LT. ARM PROBLEMS, NO HX OF SURGERY TO LT. ARM FINDINGS: The alignment of the radius and ulna is normal.  There is no acute fracture or subluxation. Soft tissues are unremarkable. No acute osseous injury of the left forearm.        ED BEDSIDE ULTRASOUND:   Performed by ED Physician - none    LABS:  Labs Reviewed - No data to display     All other labs were within normal range or not returned as of this dictation. Procedures      EMERGENCY DEPARTMENT COURSE and DIFFERENTIAL DIAGNOSIS/MDM:   Vitals:    Vitals:    08/29/20 0737   BP: 105/69   Pulse: 75   Resp: 12   Temp: 98 °F (36.7 °C)   TempSrc: Oral   SpO2: 99%   Weight: 196 lb (88.9 kg)   Height: 5' 5\" (1.651 m)       Medications   ibuprofen (ADVIL;MOTRIN) tablet 800 mg (800 mg Oral Given 8/29/20 0811)       MDM. Patient is a healthy 60-year-old female fell couple days ago at work and complains of pain to the elbow/forearm. No deformity. X-ray is negative. Suspect a forearm or elbow contusion. Placed on Motrin. Patient will be discharged home ice contusion instructions. REVAL:         CRITICAL CARE TIME   Total CriticalCare time was 0 minutes, excluding separately reportable procedures. There was a high probability of clinically significant/life threatening deterioration in the patient's condition which required my urgent intervention. CONSULTS:  None    PROCEDURES:  Unless otherwise noted below, none     [unfilled]    FINAL IMPRESSION      1. Contusion of left forearm, initial encounter          DISPOSITION/PLAN   DISPOSITION        PATIENT REFERRED TO:  [address]  Schedule an appointment as soon as possible for a visit in 1 week  If symptoms worsen      DISCHARGE MEDICATIONS:  New Prescriptions    IBUPROFEN (ADVIL;MOTRIN) 600 MG TABLET    Take 1 tablet by mouth 3 times daily as needed for Pain          (Please note:  Portions of this note were completed with a voice recognition program.Efforts were made to edit the dictations but occasionally words and phrases are mis-transcribed.)  Form v2016. J.5-cn    Gladys LYNNE MD (electronically signed)  Emergency Medicine Provider        Amara Amanda MD  08/29/20 7311

## 2021-03-08 ENCOUNTER — APPOINTMENT (OUTPATIENT)
Dept: CT IMAGING | Age: 21
End: 2021-03-08
Payer: MEDICAID

## 2021-03-08 ENCOUNTER — HOSPITAL ENCOUNTER (EMERGENCY)
Age: 21
Discharge: HOME | End: 2021-03-08
Payer: MEDICAID

## 2021-03-08 VITALS
HEART RATE: 88 BPM | WEIGHT: 207.45 LBS | OXYGEN SATURATION: 99 % | DIASTOLIC BLOOD PRESSURE: 70 MMHG | RESPIRATION RATE: 15 BRPM | HEIGHT: 65 IN | BODY MASS INDEX: 34.56 KG/M2 | TEMPERATURE: 98.6 F | SYSTOLIC BLOOD PRESSURE: 127 MMHG

## 2021-03-08 DIAGNOSIS — S09.90XA INJURY OF HEAD, INITIAL ENCOUNTER: ICD-10-CM

## 2021-03-08 DIAGNOSIS — R68.84 JAW PAIN: Primary | ICD-10-CM

## 2021-03-08 LAB — HCG(URINE) PREGNANCY TEST: NEGATIVE

## 2021-03-08 PROCEDURE — 99282 EMERGENCY DEPT VISIT SF MDM: CPT

## 2021-03-08 PROCEDURE — 84703 CHORIONIC GONADOTROPIN ASSAY: CPT

## 2021-03-08 PROCEDURE — 6370000000 HC RX 637 (ALT 250 FOR IP): Performed by: PHYSICIAN ASSISTANT

## 2021-03-08 PROCEDURE — 72125 CT NECK SPINE W/O DYE: CPT

## 2021-03-08 PROCEDURE — 70486 CT MAXILLOFACIAL W/O DYE: CPT

## 2021-03-08 RX ORDER — IBUPROFEN 400 MG/1
800 TABLET ORAL ONCE
Status: COMPLETED | OUTPATIENT
Start: 2021-03-08 | End: 2021-03-08

## 2021-03-08 RX ADMIN — IBUPROFEN 800 MG: 400 TABLET, FILM COATED ORAL at 18:00

## 2021-03-08 ASSESSMENT — PAIN SCALES - GENERAL
PAINLEVEL_OUTOF10: 6
PAINLEVEL_OUTOF10: 6

## 2021-03-08 ASSESSMENT — PAIN DESCRIPTION - DESCRIPTORS: DESCRIPTORS: ACHING

## 2021-03-08 ASSESSMENT — ENCOUNTER SYMPTOMS
NAUSEA: 0
VOMITING: 0

## 2021-03-08 ASSESSMENT — PAIN DESCRIPTION - ONSET: ONSET: SUDDEN

## 2021-03-08 ASSESSMENT — PAIN DESCRIPTION - LOCATION: LOCATION: JAW
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
## 2021-03-08 NOTE — ED NOTES
AVS reviewed with patient. Verbalized understanding. AVS was printed and given to patient.       Mathieu Cotter RN  03/08/21 0551

## 2021-03-08 NOTE — ED PROVIDER NOTES
 Constipation     Depression     PTSD (post-traumatic stress disorder)     Seasonal allergies        SURGICAL HISTORY         Procedure Laterality Date     SECTION  2019       CURRENT MEDICATIONS       Discharge Medication List as of 3/8/2021  5:58 PM      CONTINUE these medications which have NOT CHANGED    Details   ibuprofen (ADVIL;MOTRIN) 600 MG tablet Take 1 tablet by mouth 3 times daily as needed for Pain, Disp-90 tablet,R-0Print      naproxen (NAPROSYN) 500 MG tablet Take 1 tablet by mouth 2 times daily (with meals) For pain, Disp-30 tablet,R-0Print      Ferrous Sulfate (IRON) 28 MG TABS Take by mouthHistorical Med      Norgestimate-Eth Estradiol (MONONESSA PO) Take by mouthHistorical Med      albuterol sulfate  (90 Base) MCG/ACT inhaler Inhale 2 puffs into the lungs every 6 hours as needed for WheezingHistorical Med             ALLERGIES     Bee pollen    FAMILY HISTORY     No family history on file. No family status information on file. SOCIAL HISTORY      reports that she has been smoking cigars. She uses smokeless tobacco. She reports current alcohol use. She reports that she does not use drugs. PHYSICAL EXAM    (up to 7 for level 4, 8 or more for level 5)     ED Triage Vitals [21 1551]   BP Temp Temp Source Pulse Resp SpO2 Height Weight   127/70 98.6 °F (37 °C) Oral 88 15 99 % 5' 5\" (1.651 m) 207 lb 7.3 oz (94.1 kg)       Physical Exam  Constitutional:       General: She is not in acute distress. Appearance: Normal appearance. She is not ill-appearing, toxic-appearing or diaphoretic. HENT:      Head: Normocephalic and atraumatic. Comments: No raccoon eyes or chaudhary signs bilaterally  No TTP left orbit. Left eye EOM intact.        Right Ear: Tympanic membrane, ear canal and external ear normal.      Left Ear: Tympanic membrane, ear canal and external ear normal.      Ears:      Comments: No hemotympanum bilaterally     Nose:      Comments: No septal hematoma     Mouth/Throat:      Mouth: Mucous membranes are moist.      Pharynx: Oropharynx is clear. No oropharyngeal exudate or posterior oropharyngeal erythema. Comments: Mild TTP of the posterior aspect of the left jawline and of the TMJ. No erythema, ecchymosis or edema. Has pain when opening mouth. Cannot open completely. Left upper and lower gumline and teeth appear normal.      Swallowing secretions and breathing without difficulty. No signs of airway compromise  Eyes:      Conjunctiva/sclera: Conjunctivae normal.      Pupils: Pupils are equal, round, and reactive to light. Neck:      Musculoskeletal: Normal range of motion and neck supple. Pulmonary:      Effort: Pulmonary effort is normal. No respiratory distress. Musculoskeletal: Normal range of motion. Comments: Minimal TTP lower cervical midline spine. No TTP upper thoracic midline   Skin:     General: Skin is warm. Neurological:      Mental Status: She is alert. Psychiatric:         Mood and Affect: Mood normal.         Behavior: Behavior normal.         Thought Content: Thought content normal.         Judgment: Judgment normal.         DIFFERENTIAL DIAGNOSIS   Subdural hematoma, Epidural Hematoma, Subarachnoid Hemorrhage, Traumatic Brain Injury, Cervical Spine fracture  Facial bone fracture, other    DIAGNOSTICRESULTS         RADIOLOGY:   Non-plain film images such as CT, Ultrasound and MRI are read by the radiologist. Plain radiographic images are visualized and preliminarily interpreted by BRYSON Romero with the below findings:      Interpretation per the Radiologist below, if available at the time of this note:    CT FACIAL BONES WO CONTRAST   Final Result   No acute traumatic injury of the facial bones. CT CERVICAL SPINE WO CONTRAST   Final Result   No acute fracture or subluxation in the cervical spine.                LABS:  Results for orders placed or performed during the hospital encounter of 03/08/21   Pregnancy, Urine   Result Value Ref Range    HCG(Urine) Pregnancy Test Negative Detects HCG level >20 MIU/mL       All other labs were within normal range or not returned as of this dictation. EMERGENCY DEPARTMENT COURSE and DIFFERENTIALDIAGNOSIS/MDM:   Vitals:    Vitals:    03/08/21 1551   BP: 127/70   Pulse: 88   Resp: 15   Temp: 98.6 °F (37 °C)   TempSrc: Oral   SpO2: 99%   Weight: 207 lb 7.3 oz (94.1 kg)   Height: 5' 5\" (1.651 m)       Patient wasnontoxic, well appearing, afebrile with normal vital signs. Saturating well on room air. Head injury was 5 days ago. No hx of LOC or ASA or anticoagulant use. Denies headache now. Low suspicion for intracranial hemorrhage. Has TTP over the left mandible and cannot open mouth completely due to pain. Will obtain CT facial bones and also cervical spine. Ct head and cervical spine negative. Pregnancy test was negative. Upon reevaluation, she clinically appears well. Believe she is appropriate for outpatient management. Instructed to FU with PCP next few days for reeval and return for worsening. She agreed and understood    I estimate there is LOW risk for SKULL FRACTURE, SUBARACHNOID HEMORRHAGE, INTRACRANIAL HEMORRHAGE, SUBDURAL OR EPIDURAL HEMATOMA,  thus I consider the discharge disposition reasonable. PROCEDURES:  None    FINAL IMPRESSION      1. Jaw pain    2.  Injury of head, initial encounter        DISPOSITION/PLAN   DISPOSITION Decision To Discharge 03/08/2021 05:53:43 PM      PATIENT REFERRED TO:  [facility] Pre-Service  [phone]  Schedule an appointment as soon as possible for a visit in 2 days  for reevaluation    [place] [id number] [phone]    As needed, If symptoms worsen    [facility]) Pre-Services  [phone]          DISCHARGE MEDICATIONS:  Discharge Medication List as of 3/8/2021  5:58 PM          (Please note that portions ofthis note were completed with a voice recognition program.  Efforts were made to edit the dictations but occasionally words are mis-transcribed.)    Vickie Boast, [address] [place] [address]  03/08/21 2011

## 2021-06-22 ENCOUNTER — APPOINTMENT (OUTPATIENT)
Dept: GENERAL RADIOLOGY | Age: 21
End: 2021-06-22
Payer: MEDICAID

## 2021-06-22 ENCOUNTER — HOSPITAL ENCOUNTER (EMERGENCY)
Age: 21
Discharge: HOME | End: 2021-06-22
Attending: EMERGENCY MEDICINE
Payer: MEDICAID

## 2021-06-22 VITALS
TEMPERATURE: 98.9 F | WEIGHT: 213.85 LBS | SYSTOLIC BLOOD PRESSURE: 126 MMHG | HEART RATE: 74 BPM | BODY MASS INDEX: 35.59 KG/M2 | DIASTOLIC BLOOD PRESSURE: 74 MMHG | OXYGEN SATURATION: 99 % | RESPIRATION RATE: 18 BRPM

## 2021-06-22 DIAGNOSIS — M79.671 RIGHT FOOT PAIN: Primary | ICD-10-CM

## 2021-06-22 DIAGNOSIS — Z32.02 PREGNANCY EXAMINATION OR TEST, NEGATIVE RESULT: ICD-10-CM

## 2021-06-22 LAB
BILIRUBIN URINE: NEGATIVE
BLOOD, URINE: NEGATIVE
CLARITY: CLEAR
COLOR: NORMAL
GLUCOSE URINE: NEGATIVE MG/DL
HCG(URINE) PREGNANCY TEST: NEGATIVE
KETONES, URINE: NEGATIVE MG/DL
LEUKOCYTE ESTERASE, URINE: NEGATIVE
MICROSCOPIC EXAMINATION: NORMAL
NITRITE, URINE: NEGATIVE
PH UA: 6 (ref 5–8)
PROTEIN UA: NEGATIVE MG/DL
SPECIFIC GRAVITY UA: 1.02 (ref 1–1.03)
URINE REFLEX TO CULTURE: NORMAL
URINE TYPE: NORMAL
UROBILINOGEN, URINE: 0.2 E.U./DL

## 2021-06-22 PROCEDURE — 73630 X-RAY EXAM OF FOOT: CPT

## 2021-06-22 PROCEDURE — 81003 URINALYSIS AUTO W/O SCOPE: CPT

## 2021-06-22 PROCEDURE — 99283 EMERGENCY DEPT VISIT LOW MDM: CPT

## 2021-06-22 PROCEDURE — 84703 CHORIONIC GONADOTROPIN ASSAY: CPT

## 2021-06-22 ASSESSMENT — PAIN DESCRIPTION - LOCATION: LOCATION: FOOT

## 2021-06-22 ASSESSMENT — PAIN DESCRIPTION - ONSET: ONSET: GRADUAL

## 2021-06-22 ASSESSMENT — PAIN DESCRIPTION - DESCRIPTORS: DESCRIPTORS: ACHING

## 2021-06-22 ASSESSMENT — PAIN DESCRIPTION - ORIENTATION: ORIENTATION: RIGHT

## 2021-06-22 ASSESSMENT — PAIN SCALES - GENERAL: PAINLEVEL_OUTOF10: 4

## 2021-06-22 ASSESSMENT — PAIN DESCRIPTION - PAIN TYPE: TYPE: ACUTE PAIN

## 2021-06-22 ASSESSMENT — PAIN DESCRIPTION - PROGRESSION: CLINICAL_PROGRESSION: NOT CHANGED

## 2021-06-22 NOTE — ED PROVIDER NOTES
[facility] ENCOUNTER      Pt Name: Kay Brody  MRN: [id number]  [place] 2000  Date of evaluation: 2021  Provider: Giulia Thomas [address] [place] [address]       Chief Complaint   Patient presents with    Foot Pain     rt foot pain NKI on going problem    Pregnancy Test         HISTORY OF PRESENT ILLNESS   (Location/Symptom, Timing/Onset, Context/Setting, Quality, Duration, Modifying Factors, Severity)  Note limiting factors. Kay Brody is a 21 y.o. female who presents to the emergency department with a complaint of a request for pregnancy test.  She states that the first day of her last menstrual period was May 13 and was normal flow and duration. She was expecting her menstrual cycle on approximately Rasheeda 15 but has not had any spotting or cramping. She states that she has been really hungry lately and suspected that she may be pregnant. She denies any abdominal pain, pelvic pain, vaginal bleeding, spotting, vaginal discharge, back pain, or flank pain. She denies any trauma or injury. She does admit to some urinary frequency and some slight pressure in the suprapubic area when she urinates only. She denies any hematuria. No fever or chills. No nausea or vomiting. No morning sickness. She is  1 para 1-0-0-1. She had a  delivery in 2019. She also request evaluation for right foot pain. She states that she has had a \"knot\" on the medial aspect of her right foot for several years. She does not recall any prior fracture or injury. She states that it gets sore from time to time especially with increased activity. She denies any current pain in this location but wanted to have it checked out while she is here. She denies any current foot or ankle pain. No recent injury. No knee or hip pain. No calf pain. No swelling. No rash. No weakness or numbness. Nursing Notes were reviewed.     HPI        REVIEW OF SYSTEMS    (2-9 systems for level 4, 10 or more for level 5)       Constitutional: Negative for fever or chills. Respiratory: Negative for shortness of breath or dyspnea on exertion. Cardiovascular: Negative for chest pain. Gastrointestinal: Negative for abdominal pain. Negative for vomiting or diarrhea. Genitourinary: Negative for flank pain. Negative for dysuria. Negative for hematuria. Neurological: Negative for headache. Musculoskeletal:  Negative edema. All systems are reviewed and are negative except for those listed above in the history of present illness and ROS. PAST MEDICAL HISTORY     Past Medical History:   Diagnosis Date    ADHD     Anxiety     Asthma     Constipation     Depression     PTSD (post-traumatic stress disorder)     Seasonal allergies          SURGICAL HISTORY       Past Surgical History:   Procedure Laterality Date     SECTION  2019         CURRENT MEDICATIONS       Previous Medications    ALBUTEROL SULFATE  (90 BASE) MCG/ACT INHALER    Inhale 2 puffs into the lungs every 6 hours as needed for Wheezing    FERROUS SULFATE (IRON) 28 MG TABS    Take by mouth    IBUPROFEN (ADVIL;MOTRIN) 600 MG TABLET    Take 1 tablet by mouth 3 times daily as needed for Pain    NAPROXEN (NAPROSYN) 500 MG TABLET    Take 1 tablet by mouth 2 times daily (with meals) For pain    NORGESTIMATE-ETH ESTRADIOL (MONONESSA PO)    Take by mouth       ALLERGIES     Bee pollen    FAMILY HISTORY     No family history on file.        SOCIAL HISTORY       Social History     Socioeconomic History    Marital status: Single     Spouse name: Not on file    Number of children: Not on file    Years of education: Not on file    Highest education level: Not on file   Occupational History    Not on file   Tobacco Use    Smoking status: Current Some Day Smoker     Types: Cigars    Smokeless tobacco: Former User   Vaping Use    Vaping Use: Never used   Substance and Sexual Activity    Alcohol use: Yes     Comment: Special occassions    Drug use: No    Sexual activity: Not on file   Other Topics Concern    Not on file   Social History Narrative    Not on file     Social Determinants of Health     Financial Resource Strain:     Difficulty of Paying Living Expenses:    Food Insecurity:     Worried About Running Out of Food in the Last Year:     [address] in the Last Year:    Transportation Needs:     Lack of Transportation (Medical):  Lack of Transportation (Non-Medical):    Physical Activity:     Days of Exercise per Week:     Minutes of Exercise per Session:    Stress:     Feeling of Stress :    Social Connections:     Frequency of Communication with Friends and Family:     Frequency of Social Gatherings with Friends and Family:     Attends Gnosticism Services:     Active Member of Clubs or Organizations:     Attends Club or Organization Meetings:     Marital Status:    Intimate Partner Violence:     Fear of Current or Ex-Partner:     Emotionally Abused:     Physically Abused:     Sexually Abused:        SCREENINGS             PHYSICAL EXAM    (up to 7 for level 4, 8 or more for level 5)     ED Triage Vitals   BP Temp Temp src Pulse Resp SpO2 Height Weight   -- -- -- -- -- -- -- --         Physical Exam   Constitutional: Awake and alert. Very pleasant. Appears comfortable. Head: No visible evidence of trauma. Normocephalic. Heart:  Regular rate and rhythm. Lungs: No conversational dyspnea or accessory muscle use. Abdomen:  Soft, nondistended, bowel sounds present. Nontender. No guarding rigidity or rebound. No masses. Uterus is not palpable. Unable to elicit any abdominal discomfort to palpation. Musculoskeletal: Extremities non-tender with full range of motion. On the medial aspect of the right foot over the navicular bone there was a bony prominence. There was no tenderness to palpation. No surrounding erythema or soft tissue swelling. Dorsalis pedis and posterior tibial pulses were strong and equal bilaterally. Remainder the foot was nontender and within normal limits. Capillary refill less than 2 seconds. Radial and dorsalis pedis pulses were intact. No calf tenderness erythema or edema. Neurological: Alert and oriented x 3. Speech clear. Cranial nerves II-XII intact. No facial droop. No acute focal motor or sensory deficits. Skin: Skin is warm and dry. No rash. Psychiatric: Normal mood and affect. Behavior is normal.         DIAGNOSTIC RESULTS     EKG: All EKG's are interpreted by the Emergency Department Physician who either signs or Co-signs this chart in the absence of a cardiologist.        RADIOLOGY:   Non-plain film images such as CT, Ultrasound and MRI are read by the radiologist. Plain radiographic images are visualized and preliminarily interpreted by the emergency physician with the below findings:        Interpretation per the Radiologist below, if available at the time of this note:    XR FOOT RIGHT (MIN 3 VIEWS)   Final Result   Prior avulsion fracture of the distal fibula, which could be recent. Correlation with point tenderness and ankle views recommended. No acute abnormality of the foot. ED BEDSIDE ULTRASOUND:   Performed by ED Physician - none    LABS:  Labs Reviewed   URINE RT REFLEX TO CULTURE    Narrative:     Performed at:  [facility]  [address] [facility]   Phone [phone]   PREGNANCY, URINE    Narrative:     Performed at:  [address] [place] [address] Laboratory  [address] [facility]   Phone [phone]       All other labs were within normal range or not returned as of this dictation.     EMERGENCY DEPARTMENT COURSE and DIFFERENTIAL DIAGNOSIS/MDM:   Vitals:    Vitals:    06/22/21 0731   BP: 126/74   Pulse: 74   Resp: 18   Temp: 98.9 °F (37.2 °C)   TempSrc: Oral   SpO2: 99%   Weight: 213 lb 13.5 oz (97 kg)         MDM      The patient presents with a request for pregnancy test.  Abdomen is nontender. Uterus is not palpable. No associated vaginal bleeding or abdominal pain. She is asymptomatic. She also request evaluation of right foot bony prominence. This is nontender currently but does get tender from time to time. X-ray of the right foot was obtained. She will be referred to podiatry. I recommended Tylenol as directed for any pain minimal weightbearing and wearing shoes with good arch support. REASSESSMENT            Pregnancy test is negative. X-ray of the right foot was negative for acute fracture. There was a questionable old avulsion fracture of the distal fibula. However, there is no bony tenderness over the distal fibula or the lateral aspect of the foot or ankle. For, this is felt to be old. If her condition worsens or new symptoms develop, she was advised to return immediately to the emergency department. CRITICAL CARE TIME   Total Critical Care time was 0 minutes, excluding separately reportable procedures. There was a high probability of clinically significant/life threatening deterioration in the patient's condition which required my urgent intervention. CONSULTS:  None    PROCEDURES:  Unless otherwise noted below, none     Procedures        FINAL IMPRESSION      1. Right foot pain    2. Pregnancy examination or test, negative result          DISPOSITION/PLAN   DISPOSITION        PATIENT REFERRED TO:  Sierra Ruiz DPM  [address]  [address]    Today        DISCHARGE MEDICATIONS:  New Prescriptions    No medications on file     Controlled Substances Monitoring:     No flowsheet data found. (Please note that portions of this note were completed with a voice recognition program.  Efforts were made to edit the dictations but occasionally words are mis-transcribed. )    SHAYY Sinclair Daughters, DO (electronically signed)  Attending Emergency Physician          Joan Perkins, DO  06/22/21 5898

## 2021-06-22 NOTE — ED NOTES
She has had small knot medial aspect of rt foot for awhile  Wanted it checked out     Tg Reid, RN  06/22/21 7706

## 2023-04-12 ENCOUNTER — TELEPHONE (OUTPATIENT)
Dept: PRIMARY CARE CLINIC | Age: 23
End: 2023-04-12

## 2023-08-24 ENCOUNTER — HOSPITAL ENCOUNTER (EMERGENCY)
Age: 23
Discharge: LEFT BEFORE DISCHARGE | End: 2023-08-24
Attending: EMERGENCY MEDICINE
Payer: MEDICAID

## 2023-08-24 VITALS
SYSTOLIC BLOOD PRESSURE: 125 MMHG | DIASTOLIC BLOOD PRESSURE: 81 MMHG | HEART RATE: 79 BPM | TEMPERATURE: 98.5 F | RESPIRATION RATE: 16 BRPM | OXYGEN SATURATION: 100 %

## 2023-08-24 DIAGNOSIS — K04.7 DENTAL ABSCESS: Primary | ICD-10-CM

## 2023-08-24 DIAGNOSIS — R22.0 FACIAL SWELLING: ICD-10-CM

## 2023-08-24 PROCEDURE — 99283 EMERGENCY DEPT VISIT LOW MDM: CPT

## 2023-08-24 ASSESSMENT — ENCOUNTER SYMPTOMS
DIARRHEA: 0
ABDOMINAL PAIN: 0
VOMITING: 0
VOICE CHANGE: 0
CHEST TIGHTNESS: 0
NAUSEA: 0
FACIAL SWELLING: 1
TROUBLE SWALLOWING: 0
SHORTNESS OF BREATH: 0
SORE THROAT: 0

## 2023-08-24 ASSESSMENT — PAIN SCALES - GENERAL: PAINLEVEL_OUTOF10: 0

## 2023-08-24 NOTE — ED PROVIDER NOTES
I was asked to evaluate the patient by [facility] but the patient had eloped from the emergency room prior to my evaluation. I did not see this patient or provide any care for this patient.      Sarah Gonzalez MD  08/24/23 9919
agreeable with this. Advised patient I would consult [address] [place] [address] dental/facial plastics as well as the emergency department attending physician. Disposition Considerations (include 1 Tests not done, Shared Decision Making, Pt Expectation of Test or Tx.): Nurse states patient got up out of her room and walked out the door. Patient eloped from the emergency department      I am the Primary Clinician of Record. FINAL IMPRESSION      1. Dental abscess    2. Facial swelling          DISPOSITION/PLAN     DISPOSITION Eloped - Left Before Treatment Complete 08/24/2023 07:28:13 PM      PATIENT REFERRED TO:  No follow-up provider specified.     DISCHARGE MEDICATIONS:  New Prescriptions    No medications on file       DISCONTINUED MEDICATIONS:  Discontinued Medications    No medications on file              (Please note that portions of this note were completed with a voice recognition program.  Efforts were made to edit the dictations but occasionally words are mis-transcribed.)    Sue Kim PA-C (electronically signed)           Sue Kim PA-C  08/24/23 1936

## 2023-08-24 NOTE — ED NOTES
Patient to nurse station asking how to get out. RN asked if I could help her and she ignored me. Patient left out the door.       Chico Britton RN  08/24/23 9409